# Patient Record
Sex: MALE | Race: BLACK OR AFRICAN AMERICAN | Employment: UNEMPLOYED | URBAN - METROPOLITAN AREA
[De-identification: names, ages, dates, MRNs, and addresses within clinical notes are randomized per-mention and may not be internally consistent; named-entity substitution may affect disease eponyms.]

---

## 2017-03-25 ENCOUNTER — HOSPITAL ENCOUNTER (EMERGENCY)
Age: 62
Discharge: HOME | End: 2017-03-25
Attending: FAMILY MEDICINE

## 2017-03-25 VITALS
HEART RATE: 69 BPM | SYSTOLIC BLOOD PRESSURE: 163 MMHG | DIASTOLIC BLOOD PRESSURE: 96 MMHG | TEMPERATURE: 97.9 F | WEIGHT: 272 LBS | RESPIRATION RATE: 18 BRPM | OXYGEN SATURATION: 97 %

## 2017-03-25 DIAGNOSIS — S39.012A LUMBAR STRAIN, INITIAL ENCOUNTER: Primary | ICD-10-CM

## 2017-03-25 RX ORDER — ORPHENADRINE CITRATE 100 MG/1
100 TABLET, EXTENDED RELEASE ORAL
Qty: 20 TAB | Refills: 0 | Status: SHIPPED | OUTPATIENT
Start: 2017-03-25

## 2017-03-25 RX ORDER — IBUPROFEN 800 MG/1
800 TABLET ORAL
Qty: 20 TAB | Refills: 0 | Status: SHIPPED | OUTPATIENT
Start: 2017-03-25

## 2017-03-25 NOTE — UC PROVIDER NOTE
Patient is a 62 y.o. male presenting with back pain. The history is provided by the patient. Back Pain    This is a new problem. The current episode started more than 2 days ago. The problem has not changed since onset. The problem occurs constantly. Patient reports not work related injury. The pain is associated with twisting. The pain is present in the lumbar spine and right side. The quality of the pain is described as aching and shooting. The pain does not radiate. The pain is at a severity of 9/10. The pain is moderate. The symptoms are aggravated by certain positions, twisting and bending. Pertinent negatives include no chest pain, no fever, no numbness, no headaches, no abdominal pain, no bladder incontinence, no leg pain, no paresthesias, no tingling and no weakness. Treatments tried: raheem-seltzer plus cold, since usually has cold start in lower back and work up to [place]" cold. Past Medical History:   Diagnosis Date    Esophageal reflux     Hypercholesteremia     Hypertension         History reviewed. No pertinent surgical history. History reviewed. No pertinent family history. Social History     Social History    Marital status: SINGLE     Spouse name: N/A    Number of children: N/A    Years of education: N/A     Occupational History    Not on file. Social History Main Topics    Smoking status: Never Smoker    Smokeless tobacco: Not on file    Alcohol use Not on file    Drug use: Not on file    Sexual activity: Not on file     Other Topics Concern    Not on file     Social History Narrative    No narrative on file                ALLERGIES: Review of patient's allergies indicates no known allergies. Review of Systems   Constitutional: Positive for activity change. Negative for chills, fatigue and fever. Respiratory: Negative for cough. Cardiovascular: Negative for chest pain. Gastrointestinal: Negative for abdominal pain, nausea and vomiting.    Genitourinary: Negative for bladder incontinence. Musculoskeletal: Positive for back pain. Skin: Negative for color change and pallor. Neurological: Negative for dizziness, tingling, weakness, numbness, headaches and paresthesias. All other systems reviewed and are negative. Vitals:    03/25/17 1823   BP: (!) 229/119   Pulse: 71   Resp: 18   Temp: 97.9 °F (36.6 °C)   SpO2: 97%   Weight: 123.4 kg (272 lb)       Physical Exam   Constitutional: He is oriented to person, place, and time. He appears well-developed and well-nourished. He appears distressed. HENT:   Head: Normocephalic and atraumatic. Eyes: Pupils are equal, round, and reactive to light. Cardiovascular: Normal rate. Pulmonary/Chest: Effort normal. No respiratory distress. Abdominal: Soft. He exhibits no distension. Musculoskeletal:        Lumbar back: He exhibits decreased range of motion, pain and spasm. He exhibits no tenderness, no bony tenderness, no swelling and no deformity. Back:    Neurological: He is alert and oriented to person, place, and time. Skin: Skin is warm and dry. Psychiatric: He has a normal mood and affect. His behavior is normal.       MDM     Differential Diagnosis; Clinical Impression; Plan:     CLINICAL IMPRESSION: lumbar strain    Plan:  1. NSAIDs  2. Muscle relaxer, heat, and topical agents advised  3.  F/U with PCP in 1 week INI        Procedures

## 2017-03-25 NOTE — DISCHARGE INSTRUCTIONS
Back Strain: Care Instructions  Your Care Instructions    Back strain happens when you overstretch, or pull, a muscle in your back. You may hurt your back in an accident or when you exercise or lift something. Most back pain will get better with rest and time. You can take care of yourself at home to help your back heal.  Follow-up care is a key part of your treatment and safety. Be sure to make and go to all appointments, and call your doctor if you are having problems. It's also a good idea to know your test results and keep a list of the medicines you take. How can you care for yourself at home? · Try to stay as active as you can, but stop or reduce any activity that causes pain. · Put ice or a cold pack on the sore muscle for 10 to 20 minutes at a time to stop swelling. Try this every 1 to 2 hours for 3 days (when you are awake) or until the swelling goes down. Put a thin cloth between the ice pack and your skin. · After 2 or 3 days, apply a heating pad on low or a warm cloth to your back. Some doctors suggest that you go back and forth between hot and cold treatments. · Take pain medicines exactly as directed. ¨ If the doctor gave you a prescription medicine for pain, take it as prescribed. ¨ If you are not taking a prescription pain medicine, ask your doctor if you can take an over-the-counter medicine. · Try sleeping on your side with a pillow between your legs. Or put a pillow under your knees when you lie on your back. These measures can ease pain in your lower back. · Return to your usual level of activity slowly. When should you call for help? Call 911 anytime you think you may need emergency care. For example, call if:  · You are unable to move a leg at all. Call your doctor now or seek immediate medical care if:  · You have new or worse symptoms in your legs, belly, or buttocks. Symptoms may include:  ¨ Numbness or tingling. ¨ Weakness. ¨ Pain.   · You lose bladder or bowel control. Watch closely for changes in your health, and be sure to contact your doctor if you are not getting better as expected. Where can you learn more? Go to http://jose l-rebecca.info/. Enter I369 in the search box to learn more about \"Back Strain: Care Instructions. \"  Current as of: May 23, 2016  Content Version: 11.1  © 0569-5592 [employer]. Care instructions adapted under license by [employer] (which disclaims liability or warranty for this information). If you have questions about a medical condition or this instruction, always ask your healthcare professional. [place] [address] any warranty or liability for your use of this information.

## 2021-06-03 ENCOUNTER — TRANSCRIBE ORDER (OUTPATIENT)
Dept: SCHEDULING | Age: 66
End: 2021-06-03

## 2021-06-03 DIAGNOSIS — Z01.89 RADIOLOGICAL EXAMINATION, NOT ELSEWHERE CLASSIFIED: Primary | ICD-10-CM

## 2021-06-16 ENCOUNTER — HOSPITAL ENCOUNTER (OUTPATIENT)
Dept: MRI IMAGING | Age: 66
Discharge: HOME | End: 2021-06-16
Attending: FAMILY MEDICINE
Payer: OTHER GOVERNMENT

## 2021-06-16 DIAGNOSIS — Z01.89 RADIOLOGICAL EXAMINATION, NOT ELSEWHERE CLASSIFIED: ICD-10-CM

## 2021-06-16 PROCEDURE — 72148 MRI LUMBAR SPINE W/O DYE: CPT

## 2023-05-11 RX ORDER — IBUPROFEN 800 MG/1
TABLET ORAL EVERY 8 HOURS PRN
COMMUNITY
Start: 2017-03-25

## 2023-05-11 RX ORDER — ORPHENADRINE CITRATE 100 MG/1
TABLET, EXTENDED RELEASE ORAL 2 TIMES DAILY PRN
COMMUNITY
Start: 2017-03-25